# Patient Record
Sex: FEMALE | Race: WHITE | NOT HISPANIC OR LATINO | URBAN - METROPOLITAN AREA
[De-identification: names, ages, dates, MRNs, and addresses within clinical notes are randomized per-mention and may not be internally consistent; named-entity substitution may affect disease eponyms.]

---

## 2023-11-30 ENCOUNTER — APPOINTMENT (EMERGENCY)
Dept: CT IMAGING | Facility: HOSPITAL | Age: 53
End: 2023-11-30
Payer: COMMERCIAL

## 2023-11-30 ENCOUNTER — HOSPITAL ENCOUNTER (EMERGENCY)
Facility: HOSPITAL | Age: 53
Discharge: HOME | End: 2023-11-30
Attending: EMERGENCY MEDICINE
Payer: COMMERCIAL

## 2023-11-30 VITALS
RESPIRATION RATE: 16 BRPM | OXYGEN SATURATION: 95 % | TEMPERATURE: 98.3 F | HEART RATE: 74 BPM | SYSTOLIC BLOOD PRESSURE: 159 MMHG | DIASTOLIC BLOOD PRESSURE: 73 MMHG
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
## 2023-11-30 DIAGNOSIS — S32.000A LUMBAR COMPRESSION FRACTURE (HCC): ICD-10-CM

## 2023-11-30 PROCEDURE — 70450 CT HEAD/BRAIN W/O DYE: CPT

## 2023-11-30 PROCEDURE — 72131 CT LUMBAR SPINE W/O DYE: CPT

## 2023-11-30 PROCEDURE — 99284 EMERGENCY DEPT VISIT MOD MDM: CPT | Performed by: EMERGENCY MEDICINE

## 2023-11-30 PROCEDURE — 99284 EMERGENCY DEPT VISIT MOD MDM: CPT

## 2023-11-30 PROCEDURE — 96372 THER/PROPH/DIAG INJ SC/IM: CPT

## 2023-11-30 RX ORDER — METHOCARBAMOL 750 MG/1
750 TABLET, FILM COATED ORAL 2 TIMES DAILY
Qty: 30 TABLET | Refills: 0 | Status: SHIPPED | OUTPATIENT
Start: 2023-11-30 | End: 2023-12-15

## 2023-11-30 RX ORDER — MORPHINE SULFATE 10 MG/ML
8 INJECTION, SOLUTION INTRAMUSCULAR; INTRAVENOUS ONCE
Status: COMPLETED | OUTPATIENT
Start: 2023-11-30 | End: 2023-11-30

## 2023-11-30 RX ADMIN — MORPHINE SULFATE 8 MG: 10 INJECTION INTRAVENOUS at 15:13

## 2023-11-30 NOTE — ED PROVIDER NOTES
History  Chief Complaint   Patient presents with    Fall     Pt reports being pulled by her dogs while walking them earlier, causing her to fall backwards. Unsure of head strike. Extensive back history. C/o mid and lower back pain now. 49 yo female who presents to ED after falling backwards while walking her dogs after they pulled their leashes. She landed on buttocks and then fell backwards striking her head, she did not lose consciousness although she does report a moderate diffuse aching headache without associated neuro complaints and without neck or upper back pain. She c/o lumbar back pain. She denies leg weakness and numbness. She denies cp and sob. Additional history from spouse at bedside. None       History reviewed. No pertinent past medical history. History reviewed. No pertinent surgical history. History reviewed. No pertinent family history. I have reviewed and agree with the history as documented. E-Cigarette/Vaping     E-Cigarette/Vaping Substances          Review of Systems   Musculoskeletal:  Positive for back pain. Physical Exam  Physical Exam  Vitals and nursing note reviewed. Constitutional:       General: She is not in acute distress. Appearance: Normal appearance. She is well-developed. She is not ill-appearing, toxic-appearing or diaphoretic. HENT:      Head: Normocephalic and atraumatic. Mouth/Throat:      Mouth: Mucous membranes are moist.      Pharynx: Oropharynx is clear. Eyes:      Conjunctiva/sclera: Conjunctivae normal.      Pupils: Pupils are equal, round, and reactive to light. Neck:      Vascular: No JVD. Cardiovascular:      Rate and Rhythm: Normal rate and regular rhythm. Pulses: Normal pulses. Heart sounds: Normal heart sounds. Pulmonary:      Effort: Pulmonary effort is normal. No respiratory distress. Breath sounds: Normal breath sounds. No stridor. Abdominal:      General: There is no distension. Palpations: Abdomen is soft. Tenderness: There is no abdominal tenderness. There is no guarding or rebound. Musculoskeletal:         General: Tenderness present. No deformity. Normal range of motion. Cervical back: Normal range of motion and neck supple. No rigidity or tenderness. Comments: Ttp over lumbar spine diffusely. No ecchymosis or skin breakdown or erythema. No c or t spine tenderness. Lymphadenopathy:      Cervical: No cervical adenopathy. Skin:     General: Skin is warm and dry. Capillary Refill: Capillary refill takes less than 2 seconds. Coloration: Skin is not jaundiced or pale. Findings: No bruising, erythema or rash. Neurological:      General: No focal deficit present. Mental Status: She is alert and oriented to person, place, and time. Cranial Nerves: No cranial nerve deficit. Sensory: No sensory deficit. Motor: No weakness or abnormal muscle tone.       Coordination: Coordination normal.      Gait: Gait normal.         Vital Signs  ED Triage Vitals [11/30/23 1445]   Temperature Pulse Respirations Blood Pressure SpO2   98.3 °F (36.8 °C) 91 20 (!) 193/91 100 %      Temp Source Heart Rate Source Patient Position - Orthostatic VS BP Location FiO2 (%)   Temporal Monitor Sitting Left arm --      Pain Score       10 - Worst Possible Pain           Vitals:    11/30/23 1445 11/30/23 1515 11/30/23 1600 11/30/23 1630   BP: (!) 193/91 (!) 193/86 164/72 159/73   Pulse: 91 84 74 74   Patient Position - Orthostatic VS: Sitting Sitting Sitting Sitting         Visual Acuity  Visual Acuity      Flowsheet Row Most Recent Value   L Pupil Size (mm) 3   R Pupil Size (mm) 3            ED Medications  Medications   morphine injection 8 mg (8 mg Intramuscular Given 11/30/23 1513)       Diagnostic Studies  Results Reviewed       None                   CT spine lumbar without contrast   Final Result by Julian Matamoros MD (11/30 1623)      Mild, acute L3 and L4 compression fractures without evidence of significant secondary stenosis. Mild L5 fracture has a chronic appearance. Correlate clinically for point tenderness. Multilevel degenerative changes and few small disc herniations. Facet osteoarthritis with degenerative grade 1 anterolisthesis at L4-L5. No evidence of high-grade stenosis. Other incidental findings above. Workstation performed: [id number]         CT head without contrast   Final Result by Ronaldo Nj MD (11/30 1609)      No acute intracranial abnormality. Workstation performed: [id number]                    Procedures  Procedures         ED Course  ED Course as of 11/30/23 2057   Thu Nov 30, 2023   1635 I have reasonably determined that electronically prescribing a controlled substance would be impractical for the patient to obtain the controlled substance prescribed by electronic prescription or would cause an untimely delay resulting in an adverse impact on the patient's medical condition. SBIRT 20yo+      Flowsheet Row Most Recent Value   Initial Alcohol Screen: US AUDIT-C     1. How often do you have a drink containing alcohol? 0 Filed at: 11/30/2023 1450   2. How many drinks containing alcohol do you have on a typical day you are drinking? 0 Filed at: 11/30/2023 1450   3a. Male UNDER 65: How often do you have five or more drinks on one occasion? 0 Filed at: 11/30/2023 1450   3b. FEMALE Any Age, or MALE 65+: How often do you have 4 or more drinks on one occassion? 0 Filed at: 11/30/2023 1450   Audit-C Score 0 Filed at: 11/30/2023 1450   ABIODUN: How many times in the past year have you. .. Used an illegal drug or used a prescription medication for non-medical reasons?  Never Filed at: 11/30/2023 1450                      Medical Decision Making  Problems Addressed:  Fall, initial encounter: complicated acute illness or injury  Lumbar compression fracture ([address]): complicated acute illness or injury    Amount and/or Complexity of Data Reviewed  Radiology: ordered. Risk  Prescription drug management. Disposition  Final diagnoses:   Fall, initial encounter   Lumbar compression fracture [facility])     Time reflects when diagnosis was documented in both MDM as applicable and the Disposition within this note       Time User Action Codes Description Comment    11/30/2023  4:35 PM Nayla Ruvalcaba Add [W19. MACIEL] Fall, initial encounter     11/30/2023  4:35 PM Nayla Ruvalcaba Add [S32.000A] Lumbar compression fracture [facility])           ED Disposition       ED Disposition   Discharge    Condition   Stable    Date/Time   Thu Nov 30, 2023 [facility] discharge to home/self care. Follow-up Information       Follow up With Specialties Details Why Contact Info Additional Information    [address] [place] [address] Emergency Department Emergency Medicine  If symptoms worsen [address] Emergency Department, [place], [place], [facility]            Discharge Medication List as of 11/30/2023  4:37 PM        START taking these medications    Details   methocarbamol (ROBAXIN) 750 mg tablet Take 1 tablet (750 mg total) by mouth 2 (two) times a day for 30 doses, Starting Thu 11/30/2023, Until Fri 12/15/2023, Print             No discharge procedures on file.     PDMP Review       None            ED Provider  Electronically Signed by             Tarik West MD  11/30/23 6993

## 2023-11-30 NOTE — DISCHARGE INSTRUCTIONS
Mild, acute L3 and L4 compression fractures without evidence of significant secondary stenosis. Mild L5 fracture has a chronic appearance. Correlate clinically for point tenderness. Multilevel degenerative changes and few small disc herniations. Facet osteoarthritis with degenerative grade 1 anterolisthesis at L4-L5.  No evidence of high-grade stenosis

## 2023-12-11 ENCOUNTER — APPOINTMENT (OUTPATIENT)
Dept: RADIOLOGY | Facility: CLINIC | Age: 53
End: 2023-12-11
Payer: COMMERCIAL

## 2023-12-11 ENCOUNTER — OFFICE VISIT (OUTPATIENT)
Dept: OBGYN CLINIC | Facility: CLINIC | Age: 53
End: 2023-12-11
Payer: COMMERCIAL

## 2023-12-11 VITALS
HEART RATE: 78 BPM | WEIGHT: 202 LBS | BODY MASS INDEX: 29.92 KG/M2 | HEIGHT: 69 IN | SYSTOLIC BLOOD PRESSURE: 136 MMHG | DIASTOLIC BLOOD PRESSURE: 86 MMHG

## 2023-12-11 DIAGNOSIS — M54.50 ACUTE BILATERAL LOW BACK PAIN, UNSPECIFIED WHETHER SCIATICA PRESENT: Primary | ICD-10-CM

## 2023-12-11 DIAGNOSIS — S32.030A COMPRESSION FRACTURE OF L3 VERTEBRA, INITIAL ENCOUNTER (HCC): ICD-10-CM

## 2023-12-11 DIAGNOSIS — M54.50 ACUTE BILATERAL LOW BACK PAIN, UNSPECIFIED WHETHER SCIATICA PRESENT: ICD-10-CM

## 2023-12-11 PROCEDURE — 72110 X-RAY EXAM L-2 SPINE 4/>VWS: CPT

## 2023-12-11 PROCEDURE — 99204 OFFICE O/P NEW MOD 45 MIN: CPT | Performed by: ORTHOPAEDIC SURGERY

## 2023-12-11 RX ORDER — SODIUM CAPRYLATE
500 POWDER (GRAM) MISCELLANEOUS
COMMUNITY

## 2023-12-11 RX ORDER — CETIRIZINE HYDROCHLORIDE 10 MG/1
TABLET ORAL
COMMUNITY

## 2023-12-11 RX ORDER — NICOTINE POLACRILEX 2 MG
10 GUM BUCCAL
COMMUNITY

## 2023-12-11 RX ORDER — IBUPROFEN 200 MG
CAPSULE ORAL
COMMUNITY

## 2023-12-11 RX ORDER — GLUCOSAMINE HCL 500 MG
100 TABLET ORAL DAILY
COMMUNITY

## 2023-12-11 RX ORDER — ALBUTEROL SULFATE 90 UG/1
AEROSOL, METERED RESPIRATORY (INHALATION)
COMMUNITY
Start: 2023-07-31

## 2023-12-11 RX ORDER — MELATONIN 5 MG
TABLET,CHEWABLE ORAL
COMMUNITY
Start: 2023-07-01

## 2023-12-11 RX ORDER — MIRABEGRON 50 MG/1
50 TABLET, FILM COATED, EXTENDED RELEASE ORAL DAILY
COMMUNITY
Start: 2023-08-10

## 2023-12-11 RX ORDER — FLUTICASONE PROPIONATE 110 UG/1
1 AEROSOL, METERED RESPIRATORY (INHALATION) 2 TIMES DAILY
COMMUNITY
Start: 2023-07-31

## 2023-12-11 RX ORDER — ASPIRIN 81 MG/1
TABLET, CHEWABLE ORAL
COMMUNITY

## 2023-12-11 RX ORDER — ATORVASTATIN CALCIUM 20 MG/1
20 TABLET, FILM COATED ORAL EVERY MORNING
COMMUNITY

## 2023-12-11 RX ORDER — TRAZODONE HYDROCHLORIDE 50 MG/1
50 TABLET ORAL
COMMUNITY

## 2023-12-11 RX ORDER — CHLORAL HYDRATE 500 MG
1000 CAPSULE ORAL DAILY
COMMUNITY

## 2023-12-11 RX ORDER — FLUTICASONE PROPIONATE 50 MCG
1 SPRAY, SUSPENSION (ML) NASAL DAILY
COMMUNITY

## 2023-12-11 NOTE — PATIENT INSTRUCTIONS
If naproxen and tylenol fail to control symptoms, may discuss with primary care regarding gabapentin for nerve pain.

## 2023-12-11 NOTE — PROGRESS NOTES
900 UJSTIN Mclaughlin MD  [address] [place] [address]  [phone]    HISTORY OF PRESENT ILLNESS:    Gonzalo Michaels is a 48 y.o. female who presents for evaluation of L3 and L4 compression fractures s/p fall while walking her dogs. The patient states that she has a chronic history of bilateral lower extremity radiculopathy after a series of falls years ago. It was treated with physical therapy, but she has had intermittent radiculopathy that has now worsened since the fall. The pain is worse with activity and twisting and better with rest. She does not smoke.       ALLERGIES:   Allergies   Allergen Reactions    Penicillins Rash, Anaphylaxis and Angioedema       MEDICATIONS:    Current Outpatient Medications:     albuterol (ProAir HFA) 90 mcg/act inhaler, 2 puffs every 4-6 hours as needed for wheezing, Disp: , Rfl:     ascorbic Acid (VITAMIN C) 500 MG CPCR, Take by mouth, Disp: , Rfl:     aspirin 81 mg chewable tablet, , Disp: , Rfl:     atorvastatin (LIPITOR) 20 mg tablet, Take 20 mg by mouth every morning, Disp: , Rfl:     Biotin 1 MG CAPS, Take 10 capsules by mouth, Disp: , Rfl:     Calcium 600 MG tablet, , Disp: , Rfl:     Calcium Carbonate 1500 (600 Ca) MG TABS, Take 1,200 mg by mouth, Disp: , Rfl:     cetirizine (ZyrTEC Allergy) 10 mg tablet, , Disp: , Rfl:     Cholecalciferol 10 MCG (400 UNIT) CHEW, Chew, Disp: , Rfl:     CINNAMON PO, Take 1,000 mg by mouth daily, Disp: , Rfl:     Coenzyme Q10 100 MG TABS, Take 100 mg by mouth daily, Disp: , Rfl:     esomeprazole (NexIUM) 20 mg capsule, Take 20 mg by mouth daily before breakfast, Disp: , Rfl:     fluticasone (FLONASE) 50 mcg/act nasal spray, 1 spray into each nostril daily, Disp: , Rfl:     fluticasone (FLOVENT HFA) 110 MCG/ACT inhaler, Inhale 1 puff 2 (two) times a day, Disp: , Rfl:     Melatonin 5 MG CHEW, , Disp: , Rfl:     methocarbamol (ROBAXIN) 750 mg tablet, Take 1 tablet (750 mg total) by mouth 2 (two) times a day for 30 doses, Disp: 30 tablet, Rfl: 0    Myrbetriq 50 MG TB24, Take 50 mg by mouth daily, Disp: , Rfl:     Omega-3 Fatty Acids (fish oil) 1,000 mg, Take 1,000 mg by mouth daily, Disp: , Rfl:     Sodium Caprylate POWD, Take 500 mg by mouth, Disp: , Rfl:     traZODone (DESYREL) 50 mg tablet, Take 50 mg by mouth daily at bedtime, Disp: , Rfl:     TURMERIC PO, Take by mouth, Disp: , Rfl:      PAST MEDICAL HISTORY:   Past Medical History:   Diagnosis Date    Elevated cholesterol/high density lipoprotein ratio        PAST SURGICAL HISTORY:  Past Surgical History:   Procedure Laterality Date    KNEE SURGERY         SOCIAL HISTORY:  Social History     Tobacco Use   Smoking Status Never   Smokeless Tobacco Never          PHYSICAL EXAM:  48 y.o. female sitting uncomfortably on exam chair in no apparent distress. Spine:  - tenderness at L3 and paraspinal musculature  - motor 5/5 strength L3-S2  - Sensation intact to light touch L3-S2  - 2+ patellar and tatianna's reflexes  - no clonus bilaterally  - extremity warm and adequately perfused. RADIOGRAPHIC STUDIES:  X-ray lumbar spine 12/11/23: demonstrates minimally displaced L3 and L4 compression fractures with no evidence change in alignment or further collapse from prior CT scan. CT lmbar spine 11/30/23: demonstrates L3 and L4 compression fracture without evidence of instability or posterior element involvement. Evidence of central stenosis at L3-4 noted        ASSESSMENT:  1. Acute bilateral low back pain, unspecified whether sciatica present  -     XR spine lumbar minimum 4 views non injury; Future; Expected date: 12/11/2023        PLAN:  48 y.o. female with L3 and L4 compression fractures s/p ground level fall. The natural history of compression fractures were reviewed with the patient including that her fractures will likely heal without surgery. She has a chronic history of bilateral lower extremity radiculopathy.   Recommend continued observation until fractures heal and then may consider physical therapy. I did review the CT scan. The L4 fracture appears to be chronic. The L3 superior endplate in addition appears to be acute. Limit bending, twisting and turning at the waist  LSO offered, but declined by the patient  Continue NSAIDs, tylenol and robaxin.   DEXA scan ordered for further evaluation of osteoporosis given low mechanism of energy for the fractures    Return to clinic in 1 month for repeat evaluation, repeat x-rays and review of the DEXA scan         Scribe Attestation      I,:   am acting as a scribe while in the presence of the attending physician.:       I,:   personally performed the services described in this documentation    as scribed in my presence.:

## 2023-12-18 ENCOUNTER — HOSPITAL ENCOUNTER (OUTPATIENT)
Age: 53
Discharge: HOME | End: 2023-12-18
Payer: COMMERCIAL

## 2023-12-18 VITALS — BODY MASS INDEX: 30.71 KG/M2 | HEIGHT: 68 IN

## 2023-12-18 DIAGNOSIS — S32.030A COMPRESSION FRACTURE OF L3 VERTEBRA, INITIAL ENCOUNTER (HCC): ICD-10-CM

## 2023-12-18 PROCEDURE — 77080 DXA BONE DENSITY AXIAL: CPT

## 2024-01-15 ENCOUNTER — APPOINTMENT (OUTPATIENT)
Dept: RADIOLOGY | Facility: CLINIC | Age: 54
End: 2024-01-15
Payer: COMMERCIAL

## 2024-01-15 ENCOUNTER — OFFICE VISIT (OUTPATIENT)
Dept: OBGYN CLINIC | Facility: CLINIC | Age: 54
End: 2024-01-15
Payer: COMMERCIAL

## 2024-01-15 VITALS
HEART RATE: 73 BPM | WEIGHT: 200 LBS | BODY MASS INDEX: 30.31 KG/M2 | HEIGHT: 68 IN | SYSTOLIC BLOOD PRESSURE: 127 MMHG | DIASTOLIC BLOOD PRESSURE: 71 MMHG

## 2024-01-15 DIAGNOSIS — M43.10 DEGENERATIVE SPONDYLOLISTHESIS: ICD-10-CM

## 2024-01-15 DIAGNOSIS — M54.50 ACUTE BILATERAL LOW BACK PAIN WITHOUT SCIATICA: Primary | ICD-10-CM

## 2024-01-15 DIAGNOSIS — S32.030D COMPRESSION FRACTURE OF L3 VERTEBRA WITH ROUTINE HEALING, SUBSEQUENT ENCOUNTER: ICD-10-CM

## 2024-01-15 DIAGNOSIS — M54.50 ACUTE BILATERAL LOW BACK PAIN, UNSPECIFIED WHETHER SCIATICA PRESENT: ICD-10-CM

## 2024-01-15 DIAGNOSIS — S32.030A COMPRESSION FRACTURE OF L3 VERTEBRA, INITIAL ENCOUNTER (HCC): ICD-10-CM

## 2024-01-15 PROCEDURE — 72110 X-RAY EXAM L-2 SPINE 4/>VWS: CPT

## 2024-01-15 PROCEDURE — 99214 OFFICE O/P EST MOD 30 MIN: CPT | Performed by: ORTHOPAEDIC SURGERY

## 2024-01-15 RX ORDER — MELATONIN
1000 DAILY
COMMUNITY

## 2024-01-15 NOTE — PROGRESS NOTES
[facility] ORTHOPEDIC SPINE SURGERY  DR.AMIR AAKASH MD  [address] [facility] [address]  [phone]    HISTORY OF PRESENT ILLNESS:    Imelda Martinez is a 53 y.o. female who presents for follow-up of L3 and L4 compression fractures s/p fall while walking her dogs on 11/30/2023. Patient was last seen in the office on 12/11/2023 where patient was advised to avoid bending, twisting, and turning and DEXA scan was ordered. Patient reports she continues to have some discomfort in the back but it is improving. Patient has completed the DEXA scan. Patient states she has not walked her dog as directed.     Her back pain has improved patient does not have pain to palpation but does complain of symptoms when she moves.      ALLERGIES:   Allergies   Allergen Reactions    Penicillins Rash, Anaphylaxis and Angioedema       MEDICATIONS:    Current Outpatient Medications:     albuterol (ProAir HFA) 90 mcg/act inhaler, 2 puffs every 4-6 hours as needed for wheezing, Disp: , Rfl:     ascorbic Acid (VITAMIN C) 500 MG CPCR, Take by mouth, Disp: , Rfl:     aspirin 81 mg chewable tablet, , Disp: , Rfl:     atorvastatin (LIPITOR) 20 mg tablet, Take 20 mg by mouth every morning, Disp: , Rfl:     Biotin 1 MG CAPS, Take 10 capsules by mouth, Disp: , Rfl:     Calcium 600 MG tablet, , Disp: , Rfl:     cetirizine (ZyrTEC Allergy) 10 mg tablet, , Disp: , Rfl:     cholecalciferol (VITAMIN D3) 1,000 units tablet, Take 1,000 Units by mouth daily, Disp: , Rfl:     CINNAMON PO, Take 1,000 mg by mouth daily, Disp: , Rfl:     Coenzyme Q10 100 MG TABS, Take 100 mg by mouth daily, Disp: , Rfl:     esomeprazole (NexIUM) 20 mg capsule, Take 20 mg by mouth daily before breakfast, Disp: , Rfl:     fluticasone (FLONASE) 50 mcg/act nasal spray, 1 spray into each nostril daily, Disp: , Rfl:     fluticasone (FLOVENT HFA) 110 MCG/ACT inhaler, Inhale 1 puff 2 (two) times a day, Disp: , Rfl:     Melatonin 5 MG CHEW, , Disp: , Rfl:     Myrbetriq 50 MG  TB24, Take 50 mg by mouth daily, Disp: , Rfl:     Omega-3 Fatty Acids (fish oil) 1,000 mg, Take 1,000 mg by mouth daily, Disp: , Rfl:     traZODone (DESYREL) 50 mg tablet, Take 50 mg by mouth daily at bedtime, Disp: , Rfl:     TURMERIC PO, Take by mouth, Disp: , Rfl:     methocarbamol (ROBAXIN) 750 mg tablet, Take 1 tablet (750 mg total) by mouth 2 (two) times a day for 30 doses, Disp: 30 tablet, Rfl: 0     PAST MEDICAL HISTORY:   Past Medical History:   Diagnosis Date    Elevated cholesterol/high density lipoprotein ratio        PAST SURGICAL HISTORY:  Past Surgical History:   Procedure Laterality Date    KNEE SURGERY         SOCIAL HISTORY:  Social History     Tobacco Use   Smoking Status Never   Smokeless Tobacco Never          PHYSICAL EXAM:  53 y.o. female sitting comfortably on exam chair in no apparent distress.   No TTP over thoracic or lumbar spine.   Normal sagittal and coronal balance  Ambulates with normal gait  1+ patellar reflexes bilaterally      RADIOGRAPHIC STUDIES:  X-ray lumbar spine 12/11/23: demonstrates minimally displaced L3 and L4 compression fractures with no evidence change in alignment or further collapse from prior CT scan.     DEXA scan, 12/18/2023: lumbar spine T score of -0.5, left total hip T score of -1.2, and left femoral neck T score of -1.1, osteopenia.     CT lmbar spine 11/30/23: demonstrates L3 and L4 compression fracture without evidence of instability or posterior element involvement. Evidence of central stenosis at L3-4 noted    Xrays, lumbar spine, 1/15/2024: L3 and L4 superior endplate fractures.  There is mild changes at the superior endplate of L3 but no evidence of further collapse.  Alignment is well-maintained.  There is evidence of very mild degenerative spondylolisthesis, grade 1 at L4-5         ASSESSMENT:  1. Acute bilateral low back pain without sciatica  -     XR spine lumbar minimum 4 views non injury; Future; Expected date: 01/15/2024  -     Ambulatory  Referral to Physical Therapy; Future    2. Compression fracture of L3 vertebra with routine healing, subsequent encounter  -     XR spine lumbar minimum 4 views non injury; Future; Expected date: 01/15/2024  -     Ambulatory Referral to Physical Therapy; Future    3. Degenerative spondylolisthesis        PLAN:  53 y.o. female with L3 and L4 compression fractures s/p ground level fall.  Overall she is doing well with respect to the fracture.  She did bring up a prior injury in 2018 when she was injured at work.  It appears at that time she was diagnosed with facet disease and degenerative spondylolisthesis.  I did explain to her that the degenerative spondylosis is clearly seen at L4-5 but is quite mild and clinically insignificant.    Xrays of lumbar spine obtained today showing mild further compression of L3 without change in alignment. It was discussed that fracture is healing well at this time given no significant tenderness to palpation over lumbar spine. Patient is advised to start physical therapy at this time. Patient may also begin walking her dog as tolerated. DEXA scan was reviewed showing osteopenia. Given osteopenia and compression fractures, patient was advised to contact her PCP or endocrinology to discuss medical management of osteopenia.     If the patient is still symptomatic by the summer, an MRI of the lumbar spine is necessary to fully evaluate the spine.  At this time however there is no role for an MRI study.  I am quite confident that her symptoms will improve with physical therapy.    Natural history of degenerative spondylosis was discussed with the patient.  The long run this condition may become symptomatic, at this time it is not symptomatic.  I did explain that there is no need for precaution respect to degenerative spondylosis.    Patient will follow-up as needed.        Scribe Attestation      I,:  Tarah Santoro PA-C am acting as a scribe while in the presence of the attending  physician.:       I,:  Wade Guardado MD personally performed the services described in this documentation    as scribed in my presence.:

## 2024-01-17 ENCOUNTER — TELEPHONE (OUTPATIENT)
Age: 54
End: 2024-01-17

## 2024-01-17 NOTE — TELEPHONE ENCOUNTER
Caller: Patient     Doctor: Debby    Reason for call: patient is calling because she's scheduled for a sacrocolpopexy for a prolapsed bladder on 2/26. She has a lot of preop testing coming up with is a 1.5 hour test on her bladder on 2/2 and that's when she is starting PT. She wont have a lot of PT. She would like to know if Dr Guardado thinks she should wait to have this procedure due to her back issues. She states she can push it off if needed.    Call back#: [phone]

## 2024-01-17 NOTE — TELEPHONE ENCOUNTER
Called and spoke pt and relayed ALEXY Santoro's msg to her. She will try to postpone surgery is able and start PT.  This is going to be an extensive surgery with recovery.  She is also dealing w/her 22yo having a Cerebellar Stroke in December.  Will CB if needed.

## 2024-01-25 ENCOUNTER — TELEPHONE (OUTPATIENT)
Dept: OBGYN CLINIC | Facility: MEDICAL CENTER | Age: 54
End: 2024-01-25

## 2024-01-25 NOTE — TELEPHONE ENCOUNTER
Caller:  Imelda     Doctor: Debby     Reason for call: The patient received a Jury Duty notice for the entire month of March. She has to call in every Friday starting 2/23 till 3/29. She is asking due to her the severity of her pain ( and not being able to sit due to the pain for long periods of time )  and physical therapy, that she is to attend,  that she may please have a note excluding her from Jury Duty at this time.     Please place note into my chart. Thank you.     Call back#: [phone]

## 2024-01-31 NOTE — PROGRESS NOTES
PT Evaluation     Today's date: 2024  Patient name: Imelda Martinez  : 1970  MRN: [id number]  Referring provider: Tarah Santoro PA-C  Dx:   Encounter Diagnosis     ICD-10-CM    1. Acute bilateral low back pain without sciatica  M54.50 Ambulatory Referral to Physical Therapy      2. Compression fracture of L3 vertebra with routine healing, subsequent encounter  S32.030D Ambulatory Referral to Physical Therapy          Start Time: 0800  Stop Time: 0845  Total time in clinic (min): 45 minutes    Assessment  Assessment details: Patient is a 54 y/o female reporting to physical therapy with LBP s/p L3-L4 compression fx. Upon examination, patient demonstrates impairments of increased pain, decreased ROM, and decreased strength in her lumbar spine and hips which are resulting in functional limitations of her performance of ADL's/self-care and household activities. PT plan of care will focus on pain reduction, ROM, and strengthening to improve her impairments to be able to improve her daily function. Patient is a good candidate for and would benefit from skilled physical therapy to improve above listed impairments to facilitate a return to her prior level of function.     Impairments: abnormal muscle tone, abnormal or restricted ROM, activity intolerance, impaired physical strength, lacks appropriate home exercise program and pain with function  Understanding of Dx/Px/POC: good   Prognosis: good    Goals  ST weeks  1. Patient's subjective pain level at worst will improve by at least 25% for increased tolerance to functional activities.  2. Patient will become independent with her HEP.    LT weeks  1. Patient's b/l LE strength will improve to a 5/5 for increased tolerance to daily activities.   2. Patient FOTO score will improve from a 39 upon intial evaluation to a 55 or better indicating improvements in her performance of daily activities.      Plan  Patient would benefit from: PT eval and skilled  physical therapy  Planned modality interventions: cryotherapy, thermotherapy: hydrocollator packs and unattended electrical stimulation  Planned therapy interventions: IASTM, joint mobilization, kinesiology taping, manual therapy, neuromuscular re-education, patient education, postural training, strengthening, stretching, therapeutic activities, therapeutic exercise, home exercise program, functional ROM exercises, flexibility and abdominal trunk stabilization  Frequency: 2x week  Duration in weeks: 8  Plan of Care beginning date: 2024  Plan of Care expiration date: 3/29/2024  Treatment plan discussed with: patient        Subjective Evaluation    History of Present Illness  Mechanism of injury: Patient is a 52 y/o female presenting to outpatient physical therapy today with complaints of LBP s/p L3-L4 compression fx in 2023. She notes chronic back pain since she was 35 progressing into her early 40's and notes her compression fx compounded her existing issues. She notes MINGO of compression fx was walking her dogs and was pulled into an extended position forcefully. She reports difficulties with sleeping, sitting too long, bending, and turning, and feels as though her muscles get very achy and cannot calm down. Patient notes the pain feels better with use of hot pads and ice packs. Patient reports using methocarbamol for periods of extreme pain, but notes pain meds do not work for her due to her body adapting quickly to them. Patient reports experiencing radicular symptoms on occasion into her b/l low back and down her L LE. Patient imaging notes healing compression fx at L3-L4. Patient states her goals for physical therapy are to learn something new to help treat her back.   Quality of life: good    Patient Goals  Patient goals for therapy: decreased pain, increased motion, increased strength and independence with ADLs/IADLs    Pain  Current pain ratin  At best pain ratin  At worst pain rating:  10  Quality: dull ache, tight, discomfort and pressure  Relieving factors: heat and ice  Aggravating factors: stair climbing, standing, sitting and overhead activity    Treatments  Previous treatment: physical therapy, injection treatment and chiropractic        Objective     Static Posture     Lumbar Spine   Increased lordosis.     Palpation   Left   Hypertonic in the erector spinae and lumbar paraspinals.   Tenderness of the erector spinae and lumbar paraspinals.     Right   Hypertonic in the erector spinae and lumbar paraspinals.   Tenderness of the erector spinae and lumbar paraspinals.     Active Range of Motion     Lumbar   Flexion:  WFL  Extension:  Restriction level: maximal  Left lateral flexion:  with pain Restriction level: moderate  Right lateral flexion:  with pain Restriction level: moderate  Left rotation:  with pain Restriction level: moderate  Right rotation:  with pain Restriction level: moderate    Additional Active Range of Motion Details  Arc of pain with lateral side bending    Strength/Myotome Testing     Left Hip   Planes of Motion   Flexion: 4  Abduction: 4  Adduction: 4+    Right Hip   Planes of Motion   Flexion: 4  Abduction: 4  Adduction: 4+    Left Knee   Flexion: 5  Extension: 5    Right Knee   Flexion: 5  Extension: 5    Left Ankle/Foot   Dorsiflexion: 5  Plantar flexion: 5    Right Ankle/Foot   Dorsiflexion: 5  Plantar flexion: 5    Tests     Lumbar     Left   Negative passive SLR and slump test.     Right   Negative passive SLR and slump test.              Precautions: Hx L3-L4 compression fx 11/2023, Osteopenia of lumbar spine    POC expires Unit limit Auth Expiration date PT/OT + Visit Limit?   3/29 N/A Pending N/A                 Visit/Unit Tracking  AUTH Status:  Date 2/2              Pending Used 1               Remaining                  FOTO:       Manuals 2/2            Leg length assessment and MET L posterior innominate TB                                                  "  Neuro Re-Ed             Pt education regarding HEP, POC, and dx 5'            Seated TVA contractions x10                                                                             Ther Ex             Seated pball flexion 10\" x 5 grn                                                                                                       Ther Activity                                       Gait Training                                       Modalities                                            "

## 2024-02-02 ENCOUNTER — EVALUATION (OUTPATIENT)
Dept: PHYSICAL THERAPY | Facility: CLINIC | Age: 54
End: 2024-02-02
Payer: COMMERCIAL

## 2024-02-02 DIAGNOSIS — M54.50 ACUTE BILATERAL LOW BACK PAIN WITHOUT SCIATICA: Primary | ICD-10-CM

## 2024-02-02 DIAGNOSIS — S32.030D COMPRESSION FRACTURE OF L3 VERTEBRA WITH ROUTINE HEALING, SUBSEQUENT ENCOUNTER: ICD-10-CM

## 2024-02-02 PROCEDURE — 97110 THERAPEUTIC EXERCISES: CPT

## 2024-02-02 PROCEDURE — 97112 NEUROMUSCULAR REEDUCATION: CPT

## 2024-02-02 PROCEDURE — 97161 PT EVAL LOW COMPLEX 20 MIN: CPT

## 2024-02-05 ENCOUNTER — OFFICE VISIT (OUTPATIENT)
Dept: PHYSICAL THERAPY | Facility: CLINIC | Age: 54
End: 2024-02-05
Payer: COMMERCIAL

## 2024-02-05 DIAGNOSIS — M54.50 ACUTE BILATERAL LOW BACK PAIN WITHOUT SCIATICA: Primary | ICD-10-CM

## 2024-02-05 DIAGNOSIS — S32.030D COMPRESSION FRACTURE OF L3 VERTEBRA WITH ROUTINE HEALING, SUBSEQUENT ENCOUNTER: ICD-10-CM

## 2024-02-05 PROCEDURE — 97110 THERAPEUTIC EXERCISES: CPT

## 2024-02-05 PROCEDURE — 97112 NEUROMUSCULAR REEDUCATION: CPT

## 2024-02-05 NOTE — PROGRESS NOTES
"Daily Note     Today's date: 2024  Patient name: Imelda Martinez  : 1970  MRN: [id number]  Referring provider: Tarah Santoro PA-C  Dx:   Encounter Diagnosis     ICD-10-CM    1. Acute bilateral low back pain without sciatica  M54.50       2. Compression fracture of L3 vertebra with routine healing, subsequent encounter  S32.030D           Start Time: 1500  Stop Time: 1546  Total time in clinic (min): 46 minutes    Subjective: Patient reports to physical therapy today stating the burning in her low back is less from stretching, but notes her radicular symptoms persist.       Objective: See treatment diary below      Assessment: Tolerated treatment well. Patient demonstrated fatigue post treatment, exhibited good technique with therapeutic exercises, and would benefit from continued PT. Continues to provide good effort during performance of exercises. Added TB rows, Nu step, and seated sciatic nerve glides this visit to which patient responded well. Verbal cues were provided to ensure correct form during performance of new exercises. Will continue to assess patient tolerance and progress next visit, as able.        Plan: Continue per plan of care.      Precautions: Hx L3-L4 compression fx 2023, Osteopenia of lumbar spine    POC expires Unit limit Auth Expiration date PT/OT + Visit Limit?   3/29 N/A Pending N/A                 Visit/Unit Tracking  AUTH Status:  Date              Pending Used 1 2              Remaining                  FOTO:       Manuals            Leg length assessment and MET L posterior innominate TB TB                                                  Neuro Re-Ed             Pt education regarding HEP, POC, and dx 5' 5'           Seated TVA contractions x10            TB rows  2x10 grn           TB extensions   nv           Seated sciatic nerve glides  X20 ea b/l                                      Ther Ex             Seated pball flexion 10\" x 5 grn 10\" x 5 grn     "       LTR             Bridges                                                                 Nu step  5' lvl 1 seat            Ther Activity                                       Gait Training                                       Modalities

## 2024-02-07 ENCOUNTER — OFFICE VISIT (OUTPATIENT)
Dept: PHYSICAL THERAPY | Facility: CLINIC | Age: 54
End: 2024-02-07
Payer: COMMERCIAL

## 2024-02-07 DIAGNOSIS — S32.030D COMPRESSION FRACTURE OF L3 VERTEBRA WITH ROUTINE HEALING, SUBSEQUENT ENCOUNTER: ICD-10-CM

## 2024-02-07 DIAGNOSIS — M54.50 ACUTE BILATERAL LOW BACK PAIN WITHOUT SCIATICA: Primary | ICD-10-CM

## 2024-02-07 PROCEDURE — 97112 NEUROMUSCULAR REEDUCATION: CPT

## 2024-02-07 PROCEDURE — 97110 THERAPEUTIC EXERCISES: CPT

## 2024-02-07 NOTE — PROGRESS NOTES
Daily Note     Today's date: 2024  Patient name: Imelda Martinez  : 1970  MRN: [id number]  Referring provider: Tarah Santoro PA-C  Dx:   Encounter Diagnosis     ICD-10-CM    1. Acute bilateral low back pain without sciatica  M54.50       2. Compression fracture of L3 vertebra with routine healing, subsequent encounter  S32.030D           Start Time: 930  Stop Time: 1015  Total time in clinic (min): 45 minutes    Subjective: Patient reports to physical therapy today stating her muscles have been aggravated this morning. She notes she spent the past few days in the car a lot.         Objective: See treatment diary below      Assessment: Tolerated treatment well. Patient demonstrated fatigue post treatment, exhibited good technique with therapeutic exercises, and would benefit from continued PT. Continues to provide good effort during performance of exercises. Added b/l LTR's to tolerance this visit to which patient responded well. Verbal cues provided to ensure correct performance of LTR's. Noted increase in R sciatic symptoms following performance of seated pball flexion. Will continue to assess patient tolerance and progress next visit, as able.         Plan: Continue per plan of care.      Precautions: Hx L3-L4 compression fx 2023, Osteopenia of lumbar spine    POC expires Unit limit Auth Expiration date PT/OT + Visit Limit?   3/29 N/A 24 N/A                 Visit/Unit Tracking  AUTH Status:  Date             12 visits Used 1 2 3             Remaining  11 10 9              FOTO:       Manuals           Leg length assessment and MET L posterior innominate TB TB TB assessment only                                                 Neuro Re-Ed             Pt education regarding HEP, POC, and dx 5' 5' 5'          Seated TVA contractions x10  x10          TB rows  2x10 grn 2x10 grn          TB extensions   nv           Seated sciatic nerve glides  X20 ea b/l  X20 ea b/l        "                              Ther Ex             Seated pball flexion 10\" x 5 grn 10\" x 5 grn 10\" x 5 grn          LTR   X10 ea 3\" hold          Bridges                                                                 Nu step  5' lvl 1 seat 10 5' lvl 1 seat 10          Ther Activity                                       Gait Training                                       Modalities                                              "

## 2024-02-12 ENCOUNTER — OFFICE VISIT (OUTPATIENT)
Dept: PHYSICAL THERAPY | Facility: CLINIC | Age: 54
End: 2024-02-12
Payer: COMMERCIAL

## 2024-02-12 DIAGNOSIS — M54.50 ACUTE BILATERAL LOW BACK PAIN WITHOUT SCIATICA: Primary | ICD-10-CM

## 2024-02-12 DIAGNOSIS — S32.030D COMPRESSION FRACTURE OF L3 VERTEBRA WITH ROUTINE HEALING, SUBSEQUENT ENCOUNTER: ICD-10-CM

## 2024-02-12 PROCEDURE — 97110 THERAPEUTIC EXERCISES: CPT

## 2024-02-12 PROCEDURE — 97112 NEUROMUSCULAR REEDUCATION: CPT

## 2024-02-12 NOTE — PROGRESS NOTES
"Daily Note     Today's date: 2024  Patient name: Imelda Martinez  : 1970  MRN: [id number]  Referring provider: Tarah Santoro PA-C  Dx:   Encounter Diagnosis     ICD-10-CM    1. Acute bilateral low back pain without sciatica  M54.50       2. Compression fracture of L3 vertebra with routine healing, subsequent encounter  S32.663D                      Subjective: Pt reports her pain was flared up after last visit.       Objective: See treatment diary below      Assessment: Tolerated treatment well. She reports feeling more sciatic pain after previous session. Encourage nerve gliding when she feels an increase in sciatic pain. She presents with LLD with her L leg longer and L hip anteriorly rotated. No change in sx after MET. Attempted alesha pose/qped rocking today due to pt report of feeling improvement in sx with this previously, but this caused more pain after 5 reps, similar to increase in pain with repeated pball flexion when she performed about 13 reps. Assess response to treatment nv. Patient demonstrated fatigue post treatment, exhibited good technique with therapeutic exercises, and would benefit from continued PT      Plan: Continue per plan of care.      Precautions: Hx L3-L4 compression fx 2023, Osteopenia of lumbar spine    POC expires Unit limit Auth Expiration date PT/OT + Visit Limit?   3/29 N/A 24 N/A                 Visit/Unit Tracking  AUTH Status:  Date            12 visits Used 1 2 3 4            Remaining  11 10 9 8             FOTO:       Manuals          Leg length assessment and MET L posterior innominate TB TB TB assessment only KT                                                Neuro Re-Ed             Pt education regarding HEP, POC, and dx 5' 5' 5' 5'         Seated TVA contractions x10  x10 3x10 5\"         TB rows  2x10 grn 2x10 grn 2x10 grn         TB extensions   nv           Seated sciatic nerve glides  X20 ea b/l  X20 ea b/l  X20 " "ea b/l         Qped rockback    5\" x5 p!                      Ther Ex             Seated pball flexion 10\" x 5 grn 10\" x 5 grn 10\" x 5 grn 10\" x 5 blue p!         LTR   X10 ea 3\" hold X10 ea 3\" hold         Bridges             Seated cat/cpw    nv         Seated piriformis stretch    nv                                   Nu step  5' lvl 1 seat 10 5' lvl 1 seat 10 5' lvl 1 seat 10         Ther Activity             STS    nv                      Gait Training                                       Modalities                                                "

## 2024-02-15 ENCOUNTER — OFFICE VISIT (OUTPATIENT)
Dept: PHYSICAL THERAPY | Facility: CLINIC | Age: 54
End: 2024-02-15
Payer: COMMERCIAL

## 2024-02-15 DIAGNOSIS — S32.030D COMPRESSION FRACTURE OF L3 VERTEBRA WITH ROUTINE HEALING, SUBSEQUENT ENCOUNTER: ICD-10-CM

## 2024-02-15 DIAGNOSIS — M54.50 ACUTE BILATERAL LOW BACK PAIN WITHOUT SCIATICA: Primary | ICD-10-CM

## 2024-02-15 PROCEDURE — 97110 THERAPEUTIC EXERCISES: CPT

## 2024-02-15 PROCEDURE — 97530 THERAPEUTIC ACTIVITIES: CPT

## 2024-02-15 PROCEDURE — 97112 NEUROMUSCULAR REEDUCATION: CPT

## 2024-02-15 NOTE — PROGRESS NOTES
"Daily Note     Today's date: 2/15/2024  Patient name: Imelda Martinez  : 1970  MRN: [id number]  Referring provider: Tarah Santoro PA-C  Dx:   Encounter Diagnosis     ICD-10-CM    1. Acute bilateral low back pain without sciatica  M54.50       2. Compression fracture of L3 vertebra with routine healing, subsequent encounter  S32.030D                      Subjective: Patient reports minimal pain on R side at start of session, just a little tingle.       Objective: See treatment diary below      Assessment: Tolerated treatment well. Focus on core stabilization. She reports feeling of tightness in LE's added piriformis stretch which felt good. She notes pain with repetitive motions. She did better with smaller sets. Added STS and suitcase carries for functional strengthening. Some discomfort with STS. Patient demonstrated fatigue post treatment and would benefit from continued PT      Plan: Progress treatment as tolerated.       Precautions: Hx L3-L4 compression fx 2023, Osteopenia of lumbar spine    POC expires Unit limit Auth Expiration date PT/OT + Visit Limit?   3/29 N/A 24 N/A                 Visit/Unit Tracking  AUTH Status:  Date 2/2 2/5 2/7 2/12 2/15  foto          12 visits Used 1 2 3 4 5           Remaining  11 10 9 8 7            FOTO:       Manuals 2/2 2/5 2/7 2/12 2/15        Leg length assessment and MET L posterior innominate TB TB TB assessment only KT                                                Neuro Re-Ed             Pt education regarding HEP, POC, and dx 5' 5' 5' 5'         Seated TVA contractions x10  x10 3x10 5\" 3x10 5\"        Seated marches     20x ea         TB rows  2x10 grn 2x10 grn 2x10 grn 2x10 grn        TB extensions   nv   2x10 grn        Seated sciatic nerve glides  X20 ea b/l  X20 ea b/l  X20 ea b/l HEP        Qped rockback    5\" x5 p! held        Standing glute set             Ther Ex             Seated pball flexion 10\" x 5 grn 10\" x 5 grn 10\" x 5 grn 10\" x 5 " "blue p! held        LTR   X10 ea 3\" hold X10 ea 3\" hold held        Bridges             Seated cat/cpw    nv held        Seated piriformis stretch    nv 1 min ea                                  Nu step  5' lvl 1 seat 10 5' lvl 1 seat 10 5' lvl 1 seat 10 5' lvl 1 seat 10        Ther Activity             STS    nv 2x5 foam         Suitcase carry      3# 2 laps 20 ft         Gait Training                                       Modalities                                                  "

## 2024-02-19 ENCOUNTER — OFFICE VISIT (OUTPATIENT)
Dept: PHYSICAL THERAPY | Facility: CLINIC | Age: 54
End: 2024-02-19
Payer: COMMERCIAL

## 2024-02-19 DIAGNOSIS — S32.030D COMPRESSION FRACTURE OF L3 VERTEBRA WITH ROUTINE HEALING, SUBSEQUENT ENCOUNTER: ICD-10-CM

## 2024-02-19 DIAGNOSIS — M54.50 ACUTE BILATERAL LOW BACK PAIN WITHOUT SCIATICA: Primary | ICD-10-CM

## 2024-02-19 PROCEDURE — 97530 THERAPEUTIC ACTIVITIES: CPT

## 2024-02-19 PROCEDURE — 97112 NEUROMUSCULAR REEDUCATION: CPT

## 2024-02-19 PROCEDURE — 97110 THERAPEUTIC EXERCISES: CPT

## 2024-02-19 NOTE — PROGRESS NOTES
"Daily Note     Today's date: 2024  Patient name: Imelda Martinez  : 1970  MRN: [id number]  Referring provider: Taarh Santoro PA-C  Dx:   Encounter Diagnosis     ICD-10-CM    1. Acute bilateral low back pain without sciatica  M54.50       2. Compression fracture of L3 vertebra with routine healing, subsequent encounter  S32.030D           Start Time: 1545  Stop Time: 1630  Total time in clinic (min): 45 minutes    Subjective: Patient reports to physical therapy today stating she was performing her exercises at home earlier today and noted a flare up in some of her symptoms following exercise when she bent down to her laundry basket. She notes localized poking feeling on the L side of her low back and notes she has felt increasing in \"popping\" of her spine with movement.         Objective: See treatment diary below      Assessment: Tolerated treatment well. Patient demonstrated fatigue post treatment, exhibited good technique with therapeutic exercises, and would benefit from continued PT. Continues to provide good effort during performance of exercises. Verbal cues provided during seated marches and suitcase carries to maintain abdominal contraction. Will continue to assess patient tolerance and progress next visit, as able.         Plan: Continue per plan of care.      Precautions: Hx L3-L4 compression fx 2023, Osteopenia of lumbar spine    POC expires Unit limit Auth Expiration date PT/OT + Visit Limit?   3/29 N/A 24 N/A                 Visit/Unit Tracking  AUTH Status:  Date 2/2 2/5 2/7 2/12 2/15  foto          12 visits Used 1 2 3 4 5 6          Remaining  11 10 9 8 7 5           FOTO, done 2/15:       Manuals 2/2 2/5 2/7 2/12 2/15 2/19       Leg length assessment and MET L posterior innominate TB TB TB assessment only KT                                                Neuro Re-Ed             Pt education regarding HEP, POC, and dx 5' 5' 5' 5'  5'        Seated TVA contractions x10  " "x10 3x10 5\" 3x10 5\" 3x10 5\"       Seated marches     20x ea  20x ea        TB rows  2x10 grn 2x10 grn 2x10 grn 2x10 grn 2x10 grn       TB extensions   nv   2x10 grn 2x10 grn       Seated sciatic nerve glides  X20 ea b/l  X20 ea b/l  X20 ea b/l HEP        Qped rockback    5\" x5 p! held        Standing glute set             Ther Ex             Seated pball flexion 10\" x 5 grn 10\" x 5 grn 10\" x 5 grn 10\" x 5 blue p! held        LTR   X10 ea 3\" hold X10 ea 3\" hold held        Bridges             Seated cat/cpw    nv held        Seated piriformis stretch    nv 1 min ea 1 min ea                                  Nu step  5' lvl 1 seat 10 5' lvl 1 seat 10 5' lvl 1 seat 10 5' lvl 1 seat 10 6' lvl 1 seat 10       Ther Activity             STS    nv 2x5 foam  2x10 foam        Suitcase carry      3# 2 laps 20 ft  3# 2 laps 20 ft        Gait Training                                       Modalities                                                    "

## 2024-02-22 ENCOUNTER — OFFICE VISIT (OUTPATIENT)
Dept: PHYSICAL THERAPY | Facility: CLINIC | Age: 54
End: 2024-02-22
Payer: COMMERCIAL

## 2024-02-22 DIAGNOSIS — M54.50 ACUTE BILATERAL LOW BACK PAIN WITHOUT SCIATICA: Primary | ICD-10-CM

## 2024-02-22 DIAGNOSIS — S32.030D COMPRESSION FRACTURE OF L3 VERTEBRA WITH ROUTINE HEALING, SUBSEQUENT ENCOUNTER: ICD-10-CM

## 2024-02-22 PROCEDURE — 97110 THERAPEUTIC EXERCISES: CPT

## 2024-02-22 PROCEDURE — 97530 THERAPEUTIC ACTIVITIES: CPT

## 2024-02-22 PROCEDURE — 97112 NEUROMUSCULAR REEDUCATION: CPT

## 2024-02-22 NOTE — PROGRESS NOTES
"Daily Note     Today's date: 2024  Patient name: Imelda Martinez  : 1970  MRN: [id number]  Referring provider: Tarah Santoro PA-C  Dx:   Encounter Diagnosis     ICD-10-CM    1. Acute bilateral low back pain without sciatica  M54.50       2. Compression fracture of L3 vertebra with routine healing, subsequent encounter  S32.030D                      Subjective: Patient states she had to clean up ice melt from her floor before she came so she is a bit sore.       Objective: See treatment diary below      Assessment: Tolerated treatment well. Focus on core stabilization. Marches performed in smaller sets to reduce tightness to lateral R leg with good response. Added standing hip ext, PPT to reduce discomfort, able to perform with no pain. More challenge on R>L. Patient demonstrated fatigue post treatment and would benefit from continued PT      Plan: Progress treatment as tolerated.       Precautions: Hx L3-L4 compression fx 2023, Osteopenia of lumbar spine    POC expires Unit limit Auth Expiration date PT/OT + Visit Limit?   3/29 N/A 24 N/A                 Visit/Unit Tracking  AUTH Status:  Date 2/2 2/5 2/7 2/12 2/15  foto         12 visits Used 1 2 3 4 5 6 7         Remaining  11 10 9 8 7 5 4          FOTO, done 2/15:       Manuals 2/2 2/5 2/7 2/12 2/15 2/19 2/22      Leg length assessment and MET L posterior innominate TB TB TB assessment only KT                                                Neuro Re-Ed             Pt education regarding HEP, POC, and dx 5' 5' 5' 5'  5'        Seated TVA contractions x10  x10 3x10 5\" 3x10 5\" 3x10 5\" 3x10 5\"      Seated marches     20x ea  20x ea  4x5 ea      TB rows  2x10 grn 2x10 grn 2x10 grn 2x10 grn 2x10 grn 2x10 grn      TB extensions   nv   2x10 grn 2x10 grn 2x10 grn       Seated sciatic nerve glides  X20 ea b/l  X20 ea b/l  X20 ea b/l HEP        Qped rockback    5\" x5 p! held        Standing hip ext        W/ PPT 1x10 ea      Ther Ex         " "    Seated pball flexion 10\" x 5 grn 10\" x 5 grn 10\" x 5 grn 10\" x 5 blue p! held        LTR   X10 ea 3\" hold X10 ea 3\" hold held        Bridges             Seated cat/cpw    nv held        Seated piriformis stretch    nv 1 min ea 1 min ea  1 min ea                                 Nu step  5' lvl 1 seat 10 5' lvl 1 seat 10 5' lvl 1 seat 10 5' lvl 1 seat 10 6' lvl 1 seat 10 6' lvl 1 seat 10            Ther Activity             STS    nv 2x5 foam  2x10 foam  2x10 foam       Suitcase carry      3# 2 laps 20 ft  3# 2 laps 20 ft  3# 2 laps 20ft 4# NV?     Gait Training                                       Modalities                                                      "

## 2024-02-26 ENCOUNTER — OFFICE VISIT (OUTPATIENT)
Dept: PHYSICAL THERAPY | Facility: CLINIC | Age: 54
End: 2024-02-26
Payer: COMMERCIAL

## 2024-02-26 DIAGNOSIS — M54.50 ACUTE BILATERAL LOW BACK PAIN WITHOUT SCIATICA: Primary | ICD-10-CM

## 2024-02-26 DIAGNOSIS — S32.030D COMPRESSION FRACTURE OF L3 VERTEBRA WITH ROUTINE HEALING, SUBSEQUENT ENCOUNTER: ICD-10-CM

## 2024-02-26 PROCEDURE — 97530 THERAPEUTIC ACTIVITIES: CPT

## 2024-02-26 PROCEDURE — 97112 NEUROMUSCULAR REEDUCATION: CPT

## 2024-02-26 PROCEDURE — 97110 THERAPEUTIC EXERCISES: CPT

## 2024-02-26 NOTE — PROGRESS NOTES
"Daily Note     Today's date: 2024  Patient name: Imelda Martinez  : 1970  MRN: [id number]  Referring provider: Tarah Santoro PA-C  Dx:   Encounter Diagnosis     ICD-10-CM    1. Acute bilateral low back pain without sciatica  M54.50       2. Compression fracture of L3 vertebra with routine healing, subsequent encounter  S32.030D           Start Time: 1545  Stop Time: 1630  Total time in clinic (min): 45 minutes    Subjective: Patient reports to physical therapy today stating today her back isn't bothering her as much today.         Objective: See treatment diary below      Assessment: Tolerated treatment well. Patient demonstrated fatigue post treatment, exhibited good technique with therapeutic exercises, and would benefit from continued PT. Continues to provide good effort during performance of exercises. Progressed suitcase carries to 4# DB to which patient noted it felt like \"her spine was compressing.\" Patient educated that additional weight will further engage back stabilizer muscles to promote strengthening. Will assess patient tolerance to progression and progress next visit, as able.         Plan: Continue per plan of care.      Precautions: Hx L3-L4 compression fx 2023, Osteopenia of lumbar spine    POC expires Unit limit Auth Expiration date PT/OT + Visit Limit?   3/29 N/A 24 N/A                 Visit/Unit Tracking  AUTH Status:  Date 2/2 2/5 2/7 2/12 2/15  foto        12 visits Used 1 2 3 4 5 6 7 8        Remaining  11 10 9 8 7 5 4 3         FOTO, done 2/15:       Manuals 2/2 2/5 2/7 2/12 2/15 2/19 2/22 2/26     Leg length assessment and MET L posterior innominate TB TB TB assessment only KT                                                Neuro Re-Ed             Pt education regarding HEP, POC, and dx 5' 5' 5' 5'  5'   5'     Seated TVA contractions x10  x10 3x10 5\" 3x10 5\" 3x10 5\" 3x10 5\" 3x10 5\"     Seated marches     20x ea  20x ea  4x5 ea 4x5 ea     TB rows  2x10 " "grn 2x10 grn 2x10 grn 2x10 grn 2x10 grn 2x10 grn 2x10 grn     TB extensions   nv   2x10 grn 2x10 grn 2x10 grn  2x10 grn     Seated sciatic nerve glides  X20 ea b/l  X20 ea b/l  X20 ea b/l HEP        Qped rockback    5\" x5 p! held        Standing hip ext        W/ PPT 1x10 ea W/ PPT 1x10 ea     Ther Ex             Seated pball flexion 10\" x 5 grn 10\" x 5 grn 10\" x 5 grn 10\" x 5 blue p! held        LTR   X10 ea 3\" hold X10 ea 3\" hold held        Bridges             Seated cat/cpw    nv held        Seated piriformis stretch    nv 1 min ea 1 min ea  1 min ea  1 min ea                                Nu step  5' lvl 1 seat 10 5' lvl 1 seat 10 5' lvl 1 seat 10 5' lvl 1 seat 10 6' lvl 1 seat 10 6' lvl 1 seat 10       6' lvl 1 seat 10        Ther Activity             STS    nv 2x5 foam  2x10 foam  2x10 foam  2x10 foam      Suitcase carry      3# 2 laps 20 ft  3# 2 laps 20 ft  3# 2 laps 20ft 4# 2 laps 20ft p!     Gait Training                                       Modalities                                                        "

## 2024-02-29 ENCOUNTER — OFFICE VISIT (OUTPATIENT)
Dept: PHYSICAL THERAPY | Facility: CLINIC | Age: 54
End: 2024-02-29
Payer: COMMERCIAL

## 2024-02-29 DIAGNOSIS — S32.030D COMPRESSION FRACTURE OF L3 VERTEBRA WITH ROUTINE HEALING, SUBSEQUENT ENCOUNTER: ICD-10-CM

## 2024-02-29 DIAGNOSIS — M54.50 ACUTE BILATERAL LOW BACK PAIN WITHOUT SCIATICA: Primary | ICD-10-CM

## 2024-02-29 PROCEDURE — 97530 THERAPEUTIC ACTIVITIES: CPT

## 2024-02-29 PROCEDURE — 97110 THERAPEUTIC EXERCISES: CPT

## 2024-02-29 PROCEDURE — 97112 NEUROMUSCULAR REEDUCATION: CPT

## 2024-02-29 NOTE — PROGRESS NOTES
"Daily Note     Today's date: 2024  Patient name: Imelda Martinez  : 1970  MRN: [id number]  Referring provider: Tarah Santoro PA-C  Dx:   Encounter Diagnosis     ICD-10-CM    1. Acute bilateral low back pain without sciatica  M54.50       2. Compression fracture of L3 vertebra with routine healing, subsequent encounter  S32.030D                      Subjective: Patient expresses some frustration at slow progress. States she was putting a few dishes away before she came and felt it.       Objective: See treatment diary below      Assessment: Tolerated treatment well. Focus on core and postural strengthening. Progressed seated TVA to mod deadbug with PB for increased core activation. Weak multifidi activation with standing hip ext. Suitcase carry held at 1 lap to reduce sx aggravation. Patient demonstrated fatigue post treatment and would benefit from continued PT      Plan: Continue per plan of care.      Precautions: Hx L3-L4 compression fx 2023, Osteopenia of lumbar spine    POC expires Unit limit Auth Expiration date PT/OT + Visit Limit?   3/29 N/A 24 N/A                 Visit/Unit Tracking  AUTH Status:  Date 2/2 2/5 2/7 2/12 2/15  foto      12 visits Used 1 2 3 4 5 6 7 8 9 10      Remaining  11 10 9 8 7 5 4 3 2 1       FOTO, done 2/15:       Manuals 2/2 2/5 2/7 2/12 2/15 2/19 2/22 2/26 2/29    Leg length assessment and MET L posterior innominate TB TB TB assessment only KT                                                Neuro Re-Ed             Pt education regarding HEP, POC, and dx 5' 5' 5' 5'  5'   5'     Seated TVA contractions x10  x10 3x10 5\" 3x10 5\" 3x10 5\" 3x10 5\" 3x10 5\" 1x10 3\" with PB     Seated marches     20x ea  20x ea  4x5 ea 4x5 ea 4x5 ea     TB rows  2x10 grn 2x10 grn 2x10 grn 2x10 grn 2x10 grn 2x10 grn 2x10 grn 2x10 blue    TB extensions   nv   2x10 grn 2x10 grn 2x10 grn  2x10 grn 2x10 grn     Seated sciatic nerve glides  X20 ea b/l  X20 ea b/l  " "X20 ea b/l HEP        Qped rockback    5\" x5 p! held        Standing hip ext        W/ PPT 1x10 ea W/ PPT 1x10 ea W/ PPT 1x10 ea                  Ther Ex             Seated pball flexion 10\" x 5 grn 10\" x 5 grn 10\" x 5 grn 10\" x 5 blue p! held        LTR   X10 ea 3\" hold X10 ea 3\" hold held        Bridges             Seated cat/cpw    nv held        Seated piriformis stretch    nv 1 min ea 1 min ea  1 min ea  1 min ea  Held                               Nu step  5' lvl 1 seat 10 5' lvl 1 seat 10 5' lvl 1 seat 10 5' lvl 1 seat 10 6' lvl 1 seat 10 6' lvl 1 seat 10       6' lvl 1 seat 10    6' lvl 1 seat 10    Ther Activity             STS    nv 2x5 foam  2x10 foam  2x10 foam  2x10 foam  2x10 foam     Suitcase carry      3# 2 laps 20 ft  3# 2 laps 20 ft  3# 2 laps 20ft 4# 2 laps 20ft p! 4# 1 lap switch arms ea end 20ft    Gait Training                                       Modalities                                                          "

## 2024-03-04 ENCOUNTER — APPOINTMENT (OUTPATIENT)
Dept: PHYSICAL THERAPY | Facility: CLINIC | Age: 54
End: 2024-03-04
Payer: COMMERCIAL

## 2024-03-07 ENCOUNTER — APPOINTMENT (OUTPATIENT)
Dept: PHYSICAL THERAPY | Facility: CLINIC | Age: 54
End: 2024-03-07
Payer: COMMERCIAL

## 2024-03-11 ENCOUNTER — OFFICE VISIT (OUTPATIENT)
Dept: PHYSICAL THERAPY | Facility: CLINIC | Age: 54
End: 2024-03-11
Payer: COMMERCIAL

## 2024-03-11 DIAGNOSIS — S32.030D COMPRESSION FRACTURE OF L3 VERTEBRA WITH ROUTINE HEALING, SUBSEQUENT ENCOUNTER: ICD-10-CM

## 2024-03-11 DIAGNOSIS — M54.50 ACUTE BILATERAL LOW BACK PAIN WITHOUT SCIATICA: Primary | ICD-10-CM

## 2024-03-11 PROCEDURE — 97530 THERAPEUTIC ACTIVITIES: CPT

## 2024-03-11 PROCEDURE — 97112 NEUROMUSCULAR REEDUCATION: CPT

## 2024-03-11 PROCEDURE — 97110 THERAPEUTIC EXERCISES: CPT

## 2024-03-11 NOTE — PROGRESS NOTES
"Daily Note     Today's date: 3/11/2024  Patient name: Imelda Martinez  : 1970  MRN: [id number]  Referring provider: Tarah Santoro PA-C  Dx:   Encounter Diagnosis     ICD-10-CM    1. Acute bilateral low back pain without sciatica  M54.50       2. Compression fracture of L3 vertebra with routine healing, subsequent encounter  S32.030D           Start Time: 1545  Stop Time: 1630  Total time in clinic (min): 45 minutes    Subjective: Patient reports to physical therapy today stating she felt that last visit the standing hip extensions set her back pain off on the L side a little bit.         Objective: See treatment diary below      Assessment: Tolerated treatment well. Patient demonstrated fatigue post treatment, exhibited good technique with therapeutic exercises, and would benefit from continued PT. Continues to provide good effort during performance of exercises. Minimal verbal cues needed to correct patient form during exercises. Assessed patient form during standing hip extensions to ensure maintenance of PPT to minimize LBP. Will continue to assess patient tolerance and progress next visit, as able.         Plan: Continue per plan of care.      Precautions: Hx L3-L4 compression fx 2023, Osteopenia of lumbar spine    POC expires Unit limit Auth Expiration date PT/OT + Visit Limit?   3/29 N/A 24 N/A                 Visit/Unit Tracking  AUTH Status:  Date 2/2 2/5 2/7 2/12 2/15  foto 2/19 2/22 2/26 2/29 3/11     12 visits Used 1 2 3 4 5 6 7 8 9 10      Remaining  11 10 9 8 7 6 5 4 3 2       FOTO, done 2/15:       Manuals 2/2 2/5 2/7 2/12 2/15 2/19 2/22 2/26 2/29 3/11   Leg length assessment and MET L posterior innominate TB TB TB assessment only KT                                                Neuro Re-Ed             Pt education regarding HEP, POC, and dx 5' 5' 5' 5'  5'   5'     Seated TVA contractions x10  x10 3x10 5\" 3x10 5\" 3x10 5\" 3x10 5\" 3x10 5\" 1x10 3\" with PB  3x10 5\"   Seated march   " "  20x ea  20x ea  4x5 ea 4x5 ea 4x5 ea  4x5 ea    TB rows  2x10 grn 2x10 grn 2x10 grn 2x10 grn 2x10 grn 2x10 grn 2x10 grn 2x10 blue 2x10 blue   TB extensions   nv   2x10 grn 2x10 grn 2x10 grn  2x10 grn 2x10 grn  2x10 grn    Seated sciatic nerve glides  X20 ea b/l  X20 ea b/l  X20 ea b/l HEP        Qped rockback    5\" x5 p! held        Standing hip ext        W/ PPT 1x10 ea W/ PPT 1x10 ea W/ PPT 1x10 ea  W/ PPT 1x10 ea                 Ther Ex             Seated pball flexion 10\" x 5 grn 10\" x 5 grn 10\" x 5 grn 10\" x 5 blue p! held        LTR   X10 ea 3\" hold X10 ea 3\" hold held        Bridges             Seated cat/cpw    nv held        Seated piriformis stretch    nv 1 min ea 1 min ea  1 min ea  1 min ea  Held  1 min ea                              Nu step  5' lvl 1 seat 10 5' lvl 1 seat 10 5' lvl 1 seat 10 5' lvl 1 seat 10 6' lvl 1 seat 10 6' lvl 1 seat 10       6' lvl 1 seat 10    6' lvl 1 seat 10 6' lvl 1 seat 10   Ther Activity             STS    nv 2x5 foam  2x10 foam  2x10 foam  2x10 foam  2x10 foam  2x10 foam    Suitcase carry      3# 2 laps 20 ft  3# 2 laps 20 ft  3# 2 laps 20ft 4# 2 laps 20ft p! 4# 1 lap switch arms ea end 20ft 4# 1 lap switch arms ea end 20ft   Gait Training                                       Modalities                                                            "

## 2024-03-14 ENCOUNTER — APPOINTMENT (OUTPATIENT)
Dept: PHYSICAL THERAPY | Facility: CLINIC | Age: 54
End: 2024-03-14
Payer: COMMERCIAL

## 2024-03-18 ENCOUNTER — OFFICE VISIT (OUTPATIENT)
Dept: PHYSICAL THERAPY | Facility: CLINIC | Age: 54
End: 2024-03-18
Payer: COMMERCIAL

## 2024-03-18 DIAGNOSIS — M54.50 ACUTE BILATERAL LOW BACK PAIN WITHOUT SCIATICA: ICD-10-CM

## 2024-03-18 DIAGNOSIS — S32.030D COMPRESSION FRACTURE OF L3 VERTEBRA WITH ROUTINE HEALING, SUBSEQUENT ENCOUNTER: Primary | ICD-10-CM

## 2024-03-18 PROCEDURE — 97112 NEUROMUSCULAR REEDUCATION: CPT

## 2024-03-18 PROCEDURE — 97110 THERAPEUTIC EXERCISES: CPT

## 2024-03-18 PROCEDURE — 97530 THERAPEUTIC ACTIVITIES: CPT

## 2024-03-18 NOTE — PROGRESS NOTES
"Daily Note     Today's date: 3/18/2024  Patient name: Imelda Martinez  : 1970  MRN: [id number]  Referring provider: Tarah Santoro PA-C  Dx:   Encounter Diagnosis     ICD-10-CM    1. Compression fracture of L3 vertebra with routine healing, subsequent encounter  S32.030D       2. Acute bilateral low back pain without sciatica  M54.50                      Subjective: Pt reports she was feeling ok, until she tried on a swimsuit at home and it aggravated her back.       Objective: See treatment diary below      Assessment: Tolerated treatment well. Pt demonstrates good effort throughout session. Minimal curing given to facilitate proper exercise performance and technique.  Set for STS decreased due to pain. She tolerates increased TB resistance for extensions today. Adjusted performance of seated piriformis stretch to be leaning forward vs pulling the leg up, she reports improvement in stretch and less strain on her arms with this. Patient demonstrated fatigue post treatment, exhibited good technique with therapeutic exercises, and would benefit from continued PT      Plan: Continue per plan of care.      Precautions: Hx L3-L4 compression fx 2023, Osteopenia of lumbar spine    POC expires Unit limit Auth Expiration date PT/OT + Visit Limit?   3/29 N/A 24 N/A                 Visit/Unit Tracking  AUTH Status:  Date 2/2 2/5 2/7 2/12 2/15  foto 2/19 2/22 2/26 2/29 3/11     12 visits Used 1 2 3 4 5 6 7 8 9 10      Remaining  11 10 9 8 7 6 5 4 3 2       FOTO, done 2/15:       Manuals 3/18   2/12 2/15 2/19 2/22 2/26 2/29 3/11   Leg length assessment and MET L posterior innominate    KT                                                Neuro Re-Ed             Pt education regarding HEP, POC, and dx 4'   5'  5'   5'     Seated TVA contractions 3x10 5\"   3x10 5\" 3x10 5\" 3x10 5\" 3x10 5\" 3x10 5\" 1x10 3\" with PB  3x10 5\"   Seated marches 4x5 ea     20x ea  20x ea  4x5 ea 4x5 ea 4x5 ea  4x5 ea    TB rows 2x10 blue   " "2x10 grn 2x10 grn 2x10 grn 2x10 grn 2x10 grn 2x10 blue 2x10 blue   TB extensions  2x10 blue    2x10 grn 2x10 grn 2x10 grn  2x10 grn 2x10 grn  2x10 grn    Seated sciatic nerve glides    X20 ea b/l HEP        Qped rockback    5\" x5 p! held        Standing hip ext  W/ PPT 1x10 ea       W/ PPT 1x10 ea W/ PPT 1x10 ea W/ PPT 1x10 ea  W/ PPT 1x10 ea                 Ther Ex             Seated pball flexion    10\" x 5 blue p! held        LTR    X10 ea 3\" hold held        Bridges             Seated cat/cpw    nv held        Seated piriformis stretch 1 min ea leaning forward   nv 1 min ea 1 min ea  1 min ea  1 min ea  Held  1 min ea                              Nu step 6' lvl 1 seat 10   5' lvl 1 seat 10 5' lvl 1 seat 10 6' lvl 1 seat 10 6' lvl 1 seat 10       6' lvl 1 seat 10    6' lvl 1 seat 10 6' lvl 1 seat 10   Ther Activity             STS 1x10 foam    nv 2x5 foam  2x10 foam  2x10 foam  2x10 foam  2x10 foam  2x10 foam    Suitcase carry  4# 2 lap switch arms ea end 20ft    3# 2 laps 20 ft  3# 2 laps 20 ft  3# 2 laps 20ft 4# 2 laps 20ft p! 4# 1 lap switch arms ea end 20ft 4# 1 lap switch arms ea end 20ft   Gait Training                                       Modalities                                                              "

## 2024-03-25 ENCOUNTER — EVALUATION (OUTPATIENT)
Dept: PHYSICAL THERAPY | Facility: CLINIC | Age: 54
End: 2024-03-25
Payer: COMMERCIAL

## 2024-03-25 DIAGNOSIS — S32.030D COMPRESSION FRACTURE OF L3 VERTEBRA WITH ROUTINE HEALING, SUBSEQUENT ENCOUNTER: Primary | ICD-10-CM

## 2024-03-25 DIAGNOSIS — M54.50 ACUTE BILATERAL LOW BACK PAIN WITHOUT SCIATICA: ICD-10-CM

## 2024-03-25 PROCEDURE — 97112 NEUROMUSCULAR REEDUCATION: CPT

## 2024-03-25 PROCEDURE — 97110 THERAPEUTIC EXERCISES: CPT

## 2024-03-25 NOTE — PROGRESS NOTES
PT Re-Evaluation     Today's date: 3/25/2024  Patient name: Imelda Martinez  : 1970  MRN: [id number]  Referring provider: Tarah Santoro PA-C  Dx:   Encounter Diagnosis     ICD-10-CM    1. Compression fracture of L3 vertebra with routine healing, subsequent encounter  S32.030D PT plan of care cert/re-cert      2. Acute bilateral low back pain without sciatica  M54.50 PT plan of care cert/re-cert            Start Time: 1545  Stop Time: 1623  Total time in clinic (min): 38 minutes    Assessment  Assessment details: Patient is a 52 y/o female reporting to physical therapy for re-evaluation of LBP. Upon re-examination, patient demonstrates continued impairments of increased pain, decreased ROM, and decreased strength in her lumbar spine and hips. These impairments continue to be significant barriers to her ability to perform ADL's/self-care and household activities. At this time, PT recommendation is that patient would be a good candidate for performance of an aquatic therapy program secondary to intolerance to land-based therapeutic interventions after 6 weeks.   Impairments: abnormal muscle tone, abnormal or restricted ROM, activity intolerance, impaired physical strength and pain with function  Understanding of Dx/Px/POC: good   Prognosis: good    Goals  ST weeks  1. Patient's subjective pain level at worst will improve by at least 25% for increased tolerance to functional activities. MET  2. Patient will become independent with her HEP. MET    LT weeks  1. Patient's b/l LE strength will improve to a 5/5 for increased tolerance to daily activities. ONGOING  2. Patient FOTO score will improve from a 39 upon intial evaluation to a 55 or better indicating improvements in her performance of daily activities. ONGOING      Plan  Patient would benefit from: skilled physical therapy  Planned modality interventions: cryotherapy, thermotherapy: hydrocollator packs and unattended electrical  "stimulation  Planned therapy interventions: IASTM, joint mobilization, kinesiology taping, manual therapy, neuromuscular re-education, patient education, postural training, strengthening, stretching, therapeutic activities, therapeutic exercise, home exercise program, functional ROM exercises, flexibility, abdominal trunk stabilization and aquatic therapy  Frequency: 2x week  Duration in weeks: 8  Plan of Care beginning date: 3/25/2024  Plan of Care expiration date: 2024  Treatment plan discussed with: patient        Subjective Evaluation    History of Present Illness  Mechanism of injury: Patient reports to physical therapy for re-evaluation of LBP. She notes she has not felt much progress with land-based physical therapy stating she \"feels that her body is fighting against her to get better.\" She notes continued pain with only slight flexion and continues with intermittent radicular symptoms down b/l legs. She also states \"it is not that she hasn't been trying to get better and do the exercises\" and notes some days she is in too much pain to perform them. She is open to other options other than land-based therapy for pain relief, including aquatic therapy.   Quality of life: good    Patient Goals  Patient goals for therapy: decreased pain, increased motion, increased strength and independence with ADLs/IADLs    Pain  Current pain ratin  At best pain ratin  At worst pain ratin  Quality: dull ache, tight, discomfort and pressure  Relieving factors: heat and ice  Aggravating factors: stair climbing, standing, sitting and overhead activity    Treatments  Previous treatment: physical therapy, injection treatment and chiropractic        Objective     Static Posture     Lumbar Spine   Increased lordosis.     Palpation   Left   Hypertonic in the erector spinae and lumbar paraspinals.   Tenderness of the erector spinae and lumbar paraspinals.     Right   Hypertonic in the erector spinae and lumbar " "paraspinals.   Tenderness of the erector spinae and lumbar paraspinals.     Active Range of Motion     Lumbar   Flexion:  WFL  Extension:  Restriction level: maximal  Left lateral flexion:  with pain Restriction level: moderate  Right lateral flexion:  with pain Restriction level: moderate  Left rotation:  with pain Restriction level: moderate  Right rotation:  with pain Restriction level: moderate    Additional Active Range of Motion Details  Arc of pain with lateral side bending    Strength/Myotome Testing     Left Hip   Planes of Motion   Flexion: 4  Abduction: 4  Adduction: 4+    Right Hip   Planes of Motion   Flexion: 4  Abduction: 4  Adduction: 4+    Left Knee   Flexion: 5  Extension: 5    Right Knee   Flexion: 5  Extension: 5    Left Ankle/Foot   Dorsiflexion: 5  Plantar flexion: 5    Right Ankle/Foot   Dorsiflexion: 5  Plantar flexion: 5    Tests     Lumbar     Left   Negative passive SLR and slump test.     Right   Negative passive SLR and slump test.              Precautions: Hx L3-L4 compression fx 11/2023, Osteopenia of lumbar spine    POC expires Unit limit Auth Expiration date PT/OT + Visit Limit?   5/20 N/A 4/30/24 N/A                 Visit/Unit Tracking  AUTH Status:  Date 2/2 2/5 2/7 2/12 2/15  foto 2/19 2/22 2/26 2/29 3/11 3/18 3/25   12 visits Used 1 2 3 4 5 6 7 8 9 10 11 12    Remaining  11 10 9 8 7 6 5 4 3 2 1 0     FOTO, done 2/15:       Manuals 3/18 3/25  2/12 2/15 2/19 2/22 2/26 2/29 3/11   Leg length assessment and MET L posterior innominate    KT                                                Neuro Re-Ed             Pt education regarding HEP, POC, and dx 4' 30'  5'  5'   5'     Seated TVA contractions 3x10 5\"   3x10 5\" 3x10 5\" 3x10 5\" 3x10 5\" 3x10 5\" 1x10 3\" with PB  3x10 5\"   Seated marches 4x5 ea     20x ea  20x ea  4x5 ea 4x5 ea 4x5 ea  4x5 ea    TB rows 2x10 blue   2x10 grn 2x10 grn 2x10 grn 2x10 grn 2x10 grn 2x10 blue 2x10 blue   TB extensions  2x10 blue    2x10 grn 2x10 grn 2x10 grn  " "2x10 grn 2x10 grn  2x10 grn    Seated sciatic nerve glides    X20 ea b/l HEP        Qped rockback    5\" x5 p! held        Standing hip ext  W/ PPT 1x10 ea       W/ PPT 1x10 ea W/ PPT 1x10 ea W/ PPT 1x10 ea  W/ PPT 1x10 ea                 Ther Ex             Seated pball flexion    10\" x 5 blue p! held        LTR    X10 ea 3\" hold held        Bridges             Seated cat/cpw    nv held        Seated piriformis stretch 1 min ea leaning forward   nv 1 min ea 1 min ea  1 min ea  1 min ea  Held  1 min ea                              Nu step for endurance 6' lvl 1 seat 10 6' lvl 1 seat 10  5' lvl 1 seat 10 5' lvl 1 seat 10 6' lvl 1 seat 10 6' lvl 1 seat 10       6' lvl 1 seat 10    6' lvl 1 seat 10 6' lvl 1 seat 10   Ther Activity             STS 1x10 foam    nv 2x5 foam  2x10 foam  2x10 foam  2x10 foam  2x10 foam  2x10 foam    Suitcase carry  4# 2 lap switch arms ea end 20ft    3# 2 laps 20 ft  3# 2 laps 20 ft  3# 2 laps 20ft 4# 2 laps 20ft p! 4# 1 lap switch arms ea end 20ft 4# 1 lap switch arms ea end 20ft   Gait Training                                       Modalities                                             "

## 2024-03-28 NOTE — PROGRESS NOTES
3/28/24: Pt to be discharged from land-based therapy at this time secondary to exploring options for aquatic based therapy.

## 2024-05-08 ENCOUNTER — OFFICE VISIT (OUTPATIENT)
Dept: URGENT CARE | Facility: CLINIC | Age: 54
End: 2024-05-08
Payer: COMMERCIAL

## 2024-05-08 VITALS
TEMPERATURE: 97.7 F | SYSTOLIC BLOOD PRESSURE: 136 MMHG | DIASTOLIC BLOOD PRESSURE: 78 MMHG | WEIGHT: 212 LBS | RESPIRATION RATE: 16 BRPM | HEART RATE: 76 BPM | BODY MASS INDEX: 32.23 KG/M2 | OXYGEN SATURATION: 98 %

## 2024-05-08 DIAGNOSIS — J30.1 SEASONAL ALLERGIC RHINITIS DUE TO POLLEN: ICD-10-CM
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
## 2024-05-08 DIAGNOSIS — S10.96XA INSECT BITE OF NECK WITH LOCAL REACTION, INITIAL ENCOUNTER: ICD-10-CM

## 2024-05-08 DIAGNOSIS — H60.11 CELLULITIS OF CONCHA OF RIGHT EAR: Primary | ICD-10-CM

## 2024-05-08 PROCEDURE — S9088 SERVICES PROVIDED IN URGENT: HCPCS | Performed by: PHYSICIAN ASSISTANT

## 2024-05-08 PROCEDURE — 99204 OFFICE O/P NEW MOD 45 MIN: CPT | Performed by: PHYSICIAN ASSISTANT

## 2024-05-08 RX ORDER — IBANDRONATE SODIUM 150 MG/1
TABLET, FILM COATED ORAL
COMMUNITY
Start: 2024-01-31

## 2024-05-08 RX ORDER — SULFAMETHOXAZOLE AND TRIMETHOPRIM 800; 160 MG/1; MG/1
1 TABLET ORAL EVERY 12 HOURS SCHEDULED
Qty: 14 TABLET | Refills: 0 | Status: SHIPPED | OUTPATIENT
Start: 2024-05-08 | End: 2024-05-15

## 2024-05-08 RX ORDER — CYCLOBENZAPRINE HCL 10 MG
10 TABLET ORAL
COMMUNITY
Start: 2024-02-02

## 2024-05-08 RX ORDER — PREDNISONE 20 MG/1
60 TABLET ORAL DAILY
Qty: 15 TABLET | Refills: 0 | Status: SHIPPED | OUTPATIENT
Start: 2024-05-08 | End: 2024-05-13

## 2024-05-08 NOTE — PROGRESS NOTES
[facility]        NAME: Imelda Martinez is a 54 y.o. female  : 1970    MRN: [id number]  DATE: May 8, 2024  TIME: 3:11 PM      Assessment and Plan     Cellulitis of summer of right ear [H60.11]  1. Cellulitis of summer of right ear  sulfamethoxazole-trimethoprim (BACTRIM DS) 800-160 mg per tablet      2. Insect bite of neck with local reaction, initial encounter  predniSONE 20 mg tablet      3. Seasonal allergic rhinitis due to pollen  predniSONE 20 mg tablet        Note:  Rx antibiotics for ear piercing infection. Discussed piercing cleaning and care   Rx steroids for allergy symptoms and local reaction of insect bite on posterior neck     Patient Instructions   There are no Patient Instructions on file for this visit.     Follow up with PCP in 3-5 days.  Go to ER if symptoms worsen.    Chief Complaint     Chief Complaint   Patient presents with    piercing infection     Pt c/o infected piercing to right ear that she had done approx 1 1/2 weeks ago. Site is reddened, warm to touch, oozing. Pt also c/o bug bite to back of neck and puffy eye lids.         History of Present Illness     Patient presents with right ear piercing infection x 2 days. She got the piercing on her right ear conch about 10 days ago. She states it is swollen, tender, and pus is draining out. She has been cleaning it twice daily with piercing solution   Patient also states she has an insect bite on the back of her neck and puffy eyes x yesterday. She states the insect bite is itchy and her eyes are itchy. She has seasonal allergies.        Review of Systems     Review of Systems   Constitutional:  Negative for chills, fatigue and fever.   HENT:  Negative for congestion, ear pain, postnasal drip, rhinorrhea, sinus pressure, sinus pain and sore throat.    Eyes:  Positive for itching. Negative for pain and visual disturbance.   Respiratory:  Negative for cough, chest tightness and shortness of breath.    Cardiovascular:  Negative  for chest pain and palpitations.   Gastrointestinal:  Negative for abdominal pain, diarrhea, nausea and vomiting.   Genitourinary:  Negative for dysuria and hematuria.   Musculoskeletal:  Negative for arthralgias, back pain and myalgias.   Skin:  Positive for color change and wound. Negative for rash.   Neurological:  Negative for dizziness, seizures, syncope, numbness and headaches.   All other systems reviewed and are negative.        Current Medications       Current Outpatient Medications:     albuterol (ProAir HFA) 90 mcg/act inhaler, 2 puffs every 4-6 hours as needed for wheezing, Disp: , Rfl:     ascorbic Acid (VITAMIN C) 500 MG CPCR, Take by mouth, Disp: , Rfl:     atorvastatin (LIPITOR) 20 mg tablet, Take 20 mg by mouth every morning, Disp: , Rfl:     Biotin 1 MG CAPS, Take 10 capsules by mouth, Disp: , Rfl:     Calcium 600 MG tablet, , Disp: , Rfl:     cetirizine (ZyrTEC Allergy) 10 mg tablet, , Disp: , Rfl:     cholecalciferol (VITAMIN D3) 1,000 units tablet, Take 1,000 Units by mouth daily, Disp: , Rfl:     CINNAMON PO, Take 1,000 mg by mouth daily, Disp: , Rfl:     Coenzyme Q10 100 MG TABS, Take 100 mg by mouth daily, Disp: , Rfl:     cyclobenzaprine (FLEXERIL) 10 mg tablet, Take 10 mg by mouth, Disp: , Rfl:     esomeprazole (NexIUM) 20 mg capsule, Take 20 mg by mouth daily before breakfast, Disp: , Rfl:     fluticasone (FLONASE) 50 mcg/act nasal spray, 1 spray into each nostril daily, Disp: , Rfl:     fluticasone (FLOVENT HFA) 110 MCG/ACT inhaler, Inhale 1 puff 2 (two) times a day, Disp: , Rfl:     ibandronate (BONIVA) 150 MG tablet, , Disp: , Rfl:     Melatonin 5 MG CHEW, , Disp: , Rfl:     methocarbamol (ROBAXIN) 750 mg tablet, Take 1 tablet (750 mg total) by mouth 2 (two) times a day for 30 doses, Disp: 30 tablet, Rfl: 0    Myrbetriq 50 MG TB24, Take 50 mg by mouth daily, Disp: , Rfl:     Omega-3 Fatty Acids (fish oil) 1,000 mg, Take 1,000 mg by mouth daily, Disp: , Rfl:     predniSONE 20 mg tablet,  Take 3 tablets (60 mg total) by mouth daily for 5 days, Disp: 15 tablet, Rfl: 0    sulfamethoxazole-trimethoprim (BACTRIM DS) 800-160 mg per tablet, Take 1 tablet by mouth every 12 (twelve) hours for 7 days, Disp: 14 tablet, Rfl: 0    traZODone (DESYREL) 50 mg tablet, Take 50 mg by mouth daily at bedtime, Disp: , Rfl:     TURMERIC PO, Take by mouth, Disp: , Rfl:     aspirin 81 mg chewable tablet, , Disp: , Rfl:     Current Allergies     Allergies as of 05/08/2024 - Reviewed 05/08/2024   Allergen Reaction Noted    Penicillins Rash, Anaphylaxis, and Angioedema 11/26/2012              The following portions of the patient's history were reviewed and updated as appropriate: allergies, current medications, past family history, past medical history, past social history, past surgical history, and problem list.     Past Medical History:   Diagnosis Date    Elevated cholesterol/high density lipoprotein ratio        Past Surgical History:   Procedure Laterality Date    KNEE SURGERY         History reviewed. No pertinent family history.      Medications have been verified.        Objective     /78   Pulse 76   Temp 97.7 °F (36.5 °C) (Temporal)   Resp 16   Wt 96.2 kg (212 lb)   SpO2 98%   BMI 32.23 kg/m²   No LMP recorded.         Physical Exam     Physical Exam  Vitals and nursing note reviewed.   Constitutional:       Appearance: Normal appearance. She is obese.   HENT:      Head: Normocephalic and atraumatic.      Left Ear: External ear normal.      Ears:      Comments: Right external ear: Faith with intact piercing, surrounding erythema, swelling, and tenderness. Trace purulent drainage from piercing site   Eyes:      General:         Right eye: No discharge.         Left eye: No discharge.      Extraocular Movements: Extraocular movements intact.      Conjunctiva/sclera: Conjunctivae normal.      Pupils: Pupils are equal, round, and reactive to light.   Cardiovascular:      Rate and Rhythm: Normal rate.    Pulmonary:      Effort: Pulmonary effort is normal.   Skin:     General: Skin is warm and dry.      Findings: Lesion present.      Comments: Posterior nape of neck with dime-sized wheal with excoriations.    Neurological:      General: No focal deficit present.      Mental Status: She is alert and oriented to person, place, and time.   Psychiatric:         Mood and Affect: Mood normal.         Behavior: Behavior normal.

## 2025-04-23 ENCOUNTER — OFFICE VISIT (OUTPATIENT)
Dept: URGENT CARE | Facility: CLINIC | Age: 55
End: 2025-04-23
Payer: COMMERCIAL

## 2025-04-23 VITALS
OXYGEN SATURATION: 99 % | WEIGHT: 212 LBS | HEART RATE: 57 BPM | TEMPERATURE: 99 F | BODY MASS INDEX: 32.23 KG/M2 | SYSTOLIC BLOOD PRESSURE: 118 MMHG | DIASTOLIC BLOOD PRESSURE: 60 MMHG | RESPIRATION RATE: 18 BRPM

## 2025-04-23 DIAGNOSIS — R39.9 UTI SYMPTOMS: Primary | ICD-10-CM

## 2025-04-23 LAB
SL AMB  POCT GLUCOSE, UA: ABNORMAL
SL AMB LEUKOCYTE ESTERASE,UA: ABNORMAL
SL AMB POCT BILIRUBIN,UA: ABNORMAL
SL AMB POCT BLOOD,UA: ABNORMAL
SL AMB POCT CLARITY,UA: ABNORMAL
SL AMB POCT COLOR,UA: YELLOW
SL AMB POCT KETONES,UA: ABNORMAL
SL AMB POCT NITRITE,UA: ABNORMAL
SL AMB POCT PH,UA: 6
SL AMB POCT SPECIFIC GRAVITY,UA: 1.02
SL AMB POCT URINE PROTEIN: ABNORMAL
SL AMB POCT UROBILINOGEN: 0.2

## 2025-04-23 PROCEDURE — 87186 SC STD MICRODIL/AGAR DIL: CPT | Performed by: NURSE PRACTITIONER

## 2025-04-23 PROCEDURE — 87086 URINE CULTURE/COLONY COUNT: CPT | Performed by: NURSE PRACTITIONER

## 2025-04-23 PROCEDURE — 99213 OFFICE O/P EST LOW 20 MIN: CPT | Performed by: NURSE PRACTITIONER

## 2025-04-23 PROCEDURE — 87077 CULTURE AEROBIC IDENTIFY: CPT | Performed by: NURSE PRACTITIONER

## 2025-04-23 PROCEDURE — 81002 URINALYSIS NONAUTO W/O SCOPE: CPT | Performed by: NURSE PRACTITIONER

## 2025-04-23 RX ORDER — SULFAMETHOXAZOLE AND TRIMETHOPRIM 800; 160 MG/1; MG/1
1 TABLET ORAL EVERY 12 HOURS SCHEDULED
Qty: 14 TABLET | Refills: 0 | Status: SHIPPED | OUTPATIENT
Start: 2025-04-23 | End: 2025-04-30

## 2025-04-23 NOTE — PROGRESS NOTES
[facility]        NAME: Imelda Martinez is a 55 y.o. female  : 1970    MRN: [id number]  DATE: 2025  TIME: 12:33 PM    Assessment and Plan   UTI symptoms [R39.9]  1. UTI symptoms  POCT urine dip    Urine culture    Urine culture    sulfamethoxazole-trimethoprim (BACTRIM DS) 800-160 mg per tablet        Advised to start bactrim as directed, take with food. Discussed probiotics as she was just on antibiotics last month for UTI as well. Increase water intake. Will call with culture results if changes are needed.    Patient Instructions       Follow up with PCP in 3-5 days.  Proceed to  ER if symptoms worsen.    If tests are performed, our office will contact you with results only if changes need to made to the care plan discussed with you at the visit. [facility]    Chief Complaint     Chief Complaint   Patient presents with    Possible UTI     Pt here for uti symptoms that started this am and had previous uti last month and felt the same         History of Present Illness       Does have a prolapsed uterus and was unable to have the surgery last year due to life. States had a UTI last month and had a significant odor. Today woke up with the same smell and lower abdominal pains and twinges. Hx PCN allergy at age 17 and developed into anaphylaxis, prior to this took amoxicillin many times.         Review of Systems   Review of Systems   Constitutional:  Negative for chills, fatigue and fever.   Genitourinary:  Positive for dysuria and urgency.        Odor to urine   Musculoskeletal:  Negative for back pain.   Neurological:  Negative for dizziness.   Psychiatric/Behavioral:  Negative for confusion and sleep disturbance.          Current Medications       Current Outpatient Medications:     albuterol (ProAir HFA) 90 mcg/act inhaler, 2 puffs every 4-6 hours as needed for wheezing, Disp: , Rfl:     ascorbic Acid (VITAMIN C) 500 MG CPCR, Take by mouth,  Disp: , Rfl:     aspirin 81 mg chewable tablet, , Disp: , Rfl:     atorvastatin (LIPITOR) 20 mg tablet, Take 20 mg by mouth every morning, Disp: , Rfl:     Biotin 1 MG CAPS, Take 10 capsules by mouth, Disp: , Rfl:     Calcium 600 MG tablet, , Disp: , Rfl:     cetirizine (ZyrTEC Allergy) 10 mg tablet, , Disp: , Rfl:     cholecalciferol (VITAMIN D3) 1,000 units tablet, Take 1,000 Units by mouth daily, Disp: , Rfl:     CINNAMON PO, Take 1,000 mg by mouth daily, Disp: , Rfl:     Coenzyme Q10 100 MG TABS, Take 100 mg by mouth daily, Disp: , Rfl:     cyclobenzaprine (FLEXERIL) 10 mg tablet, Take 10 mg by mouth, Disp: , Rfl:     esomeprazole (NexIUM) 20 mg capsule, Take 20 mg by mouth daily before breakfast, Disp: , Rfl:     fluticasone (FLONASE) 50 mcg/act nasal spray, 1 spray into each nostril daily, Disp: , Rfl:     fluticasone (FLOVENT HFA) 110 MCG/ACT inhaler, Inhale 1 puff 2 (two) times a day, Disp: , Rfl:     ibandronate (BONIVA) 150 MG tablet, , Disp: , Rfl:     Melatonin 5 MG CHEW, , Disp: , Rfl:     Myrbetriq 50 MG TB24, Take 50 mg by mouth daily, Disp: , Rfl:     Omega-3 Fatty Acids (fish oil) 1,000 mg, Take 1,000 mg by mouth daily, Disp: , Rfl:     sulfamethoxazole-trimethoprim (BACTRIM DS) 800-160 mg per tablet, Take 1 tablet by mouth every 12 (twelve) hours for 7 days, Disp: 14 tablet, Rfl: 0    traZODone (DESYREL) 50 mg tablet, Take 50 mg by mouth daily at bedtime, Disp: , Rfl:     TURMERIC PO, Take by mouth, Disp: , Rfl:     methocarbamol (ROBAXIN) 750 mg tablet, Take 1 tablet (750 mg total) by mouth 2 (two) times a day for 30 doses, Disp: 30 tablet, Rfl: 0    Current Allergies     Allergies as of 04/23/2025 - Reviewed 04/23/2025   Allergen Reaction Noted    Penicillins Rash, Anaphylaxis, and Angioedema 11/26/2012            The following portions of the patient's history were reviewed and updated as appropriate: allergies, current medications, past family history, past medical history, past social history,  past surgical history and problem list.     Past Medical History:   Diagnosis Date    Elevated cholesterol/high density lipoprotein ratio        Past Surgical History:   Procedure Laterality Date    KNEE SURGERY         History reviewed. No pertinent family history.      Medications have been verified.        Objective   /60   Pulse 57   Temp 99 °F (37.2 °C) (Tympanic)   Resp 18   Wt 96.2 kg (212 lb)   SpO2 99%   BMI 32.23 kg/m²        Physical Exam     Physical Exam  Vitals and nursing note reviewed.   Constitutional:       General: She is not in acute distress.     Appearance: Normal appearance. She is not ill-appearing.   HENT:      Head: Normocephalic and atraumatic.   Cardiovascular:      Rate and Rhythm: Normal rate and regular rhythm.      Heart sounds: Normal heart sounds, S1 normal and S2 normal.   Pulmonary:      Effort: Pulmonary effort is normal. No accessory muscle usage.      Breath sounds: Normal breath sounds. No wheezing.   Abdominal:      Tenderness: There is no right CVA tenderness or left CVA tenderness.   Skin:     General: Skin is warm and dry.      Capillary Refill: Capillary refill takes less than 2 seconds.      Findings: No rash.   Neurological:      General: No focal deficit present.      Mental Status: She is alert and oriented to person, place, and time.      Motor: Motor function is intact.   Psychiatric:         Attention and Perception: Attention and perception normal.         Mood and Affect: Mood and affect normal.         Speech: Speech normal.         Behavior: Behavior normal. Behavior is cooperative.         Thought Content: Thought content normal.

## 2025-04-25 ENCOUNTER — RESULTS FOLLOW-UP (OUTPATIENT)
Dept: URGENT CARE | Facility: CLINIC | Age: 55
End: 2025-04-25

## 2025-04-25 LAB — BACTERIA UR CULT: ABNORMAL
